# Patient Record
Sex: FEMALE | Employment: UNEMPLOYED | URBAN - METROPOLITAN AREA
[De-identification: names, ages, dates, MRNs, and addresses within clinical notes are randomized per-mention and may not be internally consistent; named-entity substitution may affect disease eponyms.]

---
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
## 2023-10-12 ENCOUNTER — HOSPITAL ENCOUNTER (EMERGENCY)
Age: 23
Discharge: HOME | End: 2023-10-12
Attending: EMERGENCY MEDICINE
Payer: MEDICAID

## 2023-10-12 ENCOUNTER — APPOINTMENT (OUTPATIENT)
Dept: GENERAL RADIOLOGY | Age: 23
End: 2023-10-12
Payer: MEDICAID

## 2023-10-12 VITALS
OXYGEN SATURATION: 94 % | DIASTOLIC BLOOD PRESSURE: 90 MMHG | TEMPERATURE: 99.3 F | HEART RATE: 89 BPM | WEIGHT: 201.72 LBS | RESPIRATION RATE: 18 BRPM | SYSTOLIC BLOOD PRESSURE: 134 MMHG

## 2023-10-12 DIAGNOSIS — O46.90 VAGINAL BLEEDING IN PREGNANCY: Primary | ICD-10-CM

## 2023-10-12 PROBLEM — O09.90 HRP (HIGH RISK PREGNANCY): Status: ACTIVE | Noted: 2023-10-12

## 2023-10-12 LAB
ANION GAP SERPL CALCULATED.3IONS-SCNC: 13 MMOL/L (ref 9–17)
BASOPHILS # BLD: 0.05 K/UL (ref 0–0.2)
BASOPHILS NFR BLD: 0 % (ref 0–2)
BILIRUB UR QL STRIP: NEGATIVE
BUN SERPL-MCNC: 4 MG/DL (ref 6–20)
CALCIUM SERPL-MCNC: 8.9 MG/DL (ref 8.6–10.4)
CANDIDA SPECIES: NEGATIVE
CASTS #/AREA URNS LPF: ABNORMAL /LPF (ref 0–8)
CHLORIDE SERPL-SCNC: 103 MMOL/L (ref 98–107)
CLARITY UR: CLEAR
CO2 SERPL-SCNC: 20 MMOL/L (ref 20–31)
COLOR UR: YELLOW
CREAT SERPL-MCNC: 0.5 MG/DL (ref 0.5–0.9)
D DIMER PPP FEU-MCNC: 0.36 UG/ML FEU (ref 0–0.57)
EOSINOPHIL # BLD: 0.35 K/UL (ref 0–0.44)
EOSINOPHILS RELATIVE PERCENT: 3 % (ref 1–4)
EPI CELLS #/AREA URNS HPF: ABNORMAL /HPF (ref 0–5)
ERYTHROCYTE [DISTWIDTH] IN BLOOD BY AUTOMATED COUNT: 13.2 % (ref 11.8–14.4)
GARDNERELLA VAGINALIS: NEGATIVE
GFR SERPL CREATININE-BSD FRML MDRD: >60 ML/MIN/1.73M2
GLUCOSE SERPL-MCNC: 93 MG/DL (ref 70–99)
GLUCOSE UR STRIP-MCNC: NEGATIVE MG/DL
HCT VFR BLD AUTO: 38.2 % (ref 36.3–47.1)
HGB BLD-MCNC: 13.3 G/DL (ref 11.9–15.1)
HGB UR QL STRIP.AUTO: NEGATIVE
IMM GRANULOCYTES # BLD AUTO: 0.04 K/UL (ref 0–0.3)
IMM GRANULOCYTES NFR BLD: 0 %
KETONES UR STRIP-MCNC: ABNORMAL MG/DL
LEUKOCYTE ESTERASE UR QL STRIP: NEGATIVE
LYMPHOCYTES NFR BLD: 2.24 K/UL (ref 1.2–5.2)
LYMPHOCYTES RELATIVE PERCENT: 18 % (ref 25–45)
MCH RBC QN AUTO: 30 PG (ref 25.2–33.5)
MCHC RBC AUTO-ENTMCNC: 34.8 G/DL (ref 28.4–34.8)
MCV RBC AUTO: 86 FL (ref 82.6–102.9)
MONOCYTES NFR BLD: 0.81 K/UL (ref 0.1–1.4)
MONOCYTES NFR BLD: 6 % (ref 2–8)
NEUTROPHILS NFR BLD: 73 % (ref 34–64)
NEUTS SEG NFR BLD: 9.28 K/UL (ref 1.8–8)
NITRITE UR QL STRIP: NEGATIVE
NRBC BLD-RTO: 0 PER 100 WBC
PH UR STRIP: 6 [PH] (ref 5–8)
PLATELET # BLD AUTO: 221 K/UL (ref 138–453)
PMV BLD AUTO: 10.1 FL (ref 8.1–13.5)
POTASSIUM SERPL-SCNC: 3.4 MMOL/L (ref 3.7–5.3)
PROT UR STRIP-MCNC: NEGATIVE MG/DL
RBC # BLD AUTO: 4.44 M/UL (ref 3.95–5.11)
RBC #/AREA URNS HPF: ABNORMAL /HPF (ref 0–4)
SODIUM SERPL-SCNC: 136 MMOL/L (ref 135–144)
SOURCE: NORMAL
SP GR UR STRIP: 1.01 (ref 1–1.03)
TRICHOMONAS: NEGATIVE
UROBILINOGEN UR STRIP-ACNC: NORMAL EU/DL (ref 0–1)
WBC #/AREA URNS HPF: ABNORMAL /HPF (ref 0–5)
WBC OTHER # BLD: 12.8 K/UL (ref 4.5–13.5)

## 2023-10-12 PROCEDURE — 87510 GARDNER VAG DNA DIR PROBE: CPT

## 2023-10-12 PROCEDURE — 87491 CHLMYD TRACH DNA AMP PROBE: CPT

## 2023-10-12 PROCEDURE — 87591 N.GONORRHOEAE DNA AMP PROB: CPT

## 2023-10-12 PROCEDURE — 87086 URINE CULTURE/COLONY COUNT: CPT

## 2023-10-12 PROCEDURE — 81001 URINALYSIS AUTO W/SCOPE: CPT

## 2023-10-12 PROCEDURE — 87660 TRICHOMONAS VAGIN DIR PROBE: CPT

## 2023-10-12 PROCEDURE — 80048 BASIC METABOLIC PNL TOTAL CA: CPT

## 2023-10-12 PROCEDURE — 99284 EMERGENCY DEPT VISIT MOD MDM: CPT

## 2023-10-12 PROCEDURE — 85025 COMPLETE CBC W/AUTO DIFF WBC: CPT

## 2023-10-12 PROCEDURE — 85379 FIBRIN DEGRADATION QUANT: CPT

## 2023-10-12 PROCEDURE — 87480 CANDIDA DNA DIR PROBE: CPT

## 2023-10-12 PROCEDURE — 71046 X-RAY EXAM CHEST 2 VIEWS: CPT

## 2023-10-12 RX ORDER — PNV NO.95/FERROUS FUM/FOLIC AC 28MG-0.8MG
1 TABLET ORAL
Qty: 90 TABLET | Refills: 11 | Status: SHIPPED | OUTPATIENT
Start: 2023-10-12

## 2023-10-13 ENCOUNTER — TELEPHONE (OUTPATIENT)
Dept: OBGYN | Age: 23
End: 2023-10-13

## 2023-10-13 PROBLEM — F31.9 BIPOLAR DISORDER (HCC): Status: ACTIVE | Noted: 2023-10-13

## 2023-10-13 PROBLEM — Z91.51 HISTORY OF SUICIDE ATTEMPT: Status: ACTIVE | Noted: 2023-10-13

## 2023-10-13 PROBLEM — Z62.810 HISTORY OF SEXUAL ABUSE IN CHILDHOOD: Status: ACTIVE | Noted: 2023-10-13

## 2023-10-13 PROBLEM — J45.909 MILD ASTHMA: Status: ACTIVE | Noted: 2023-10-13

## 2023-10-13 PROBLEM — F31.4 SEVERE DEPRESSED BIPOLAR I DISORDER WITHOUT PSYCHOTIC FEATURES (HCC): Status: ACTIVE | Noted: 2020-12-27

## 2023-10-13 PROBLEM — Z09 NEED FOR FOLLOW-UP CARE AFTER DISCHARGE: Status: ACTIVE | Noted: 2023-10-13

## 2023-10-13 PROBLEM — Z72.0 TOBACCO USE: Status: ACTIVE | Noted: 2023-10-13

## 2023-10-13 PROBLEM — F12.10 TETRAHYDROCANNABINOL (THC) USE DISORDER, MILD, ABUSE: Status: ACTIVE | Noted: 2023-10-13

## 2023-10-13 LAB
MICROORGANISM SPEC CULT: NO GROWTH
SPECIMEN DESCRIPTION: NORMAL

## 2023-10-13 RX ORDER — ALBUTEROL SULFATE 90 UG/1
2 AEROSOL, METERED RESPIRATORY (INHALATION) EVERY 6 HOURS PRN
COMMUNITY

## 2023-10-13 RX ORDER — LURASIDONE HYDROCHLORIDE 20 MG/1
TABLET, FILM COATED ORAL
COMMUNITY
Start: 2021-07-28

## 2023-10-13 NOTE — ED NOTES
Pt reports to the ED with complaints of vaginal bleeding x a few hours. Pt states she called squad at home just before coming in because she had a gush of fluid, pt denies contractions at this time. Pt states she is about 3 or 4 months pregnant but has not seen OB yet. Pt states this is her 3rd pregnancy and does have two living children, denies any complications during the pregnancy or birth. Pt states she has been having some bleeding as well, saturating about one pad per hour. Pt also denies any decreased fetal movement. Pt is A&O and speaking in complete sentences. Pt is resting in bed comfortably, NAD noted. Pt denies chest pain or SOB.  Care ongoing     Sae Reese RN  10/12/23 2027

## 2023-10-13 NOTE — ED NOTES
The following labs were labeled with appropriate pt sticker and tubed to lab:     [x] Blue     [x] Lavender   [] on ice  [x] Green/yellow  [] Green/black [] on ice  [] Marmark Lopez  [] on ice  [x] Yellow  [] Red  [] Pink  [] Type/ Screen  [] ABG  [] VBG    [] COVID-19 swab    [] Rapid  [] PCR  [] Flu swab  [] Peds Viral Panel     [] Urine Sample  [] Fecal Sample  [x] Pelvic Cultures  [] Blood Cultures  [] X 2  [] STREP Cultures       Anastasia Ly RN  10/12/23 3629

## 2023-10-13 NOTE — TELEPHONE ENCOUNTER
Writer called the number on file- patients mother answered the phone. Writer asked to speak with Chet and patients mother stated she was not there. Writer asked patients mother how patient was feeling and her mother responds with \"probably cold\". Patients mother states that patient is living on the streets because all of the shelters are full. Patient does not have a phone so writer scheduled appointment with patients mother who will bring her to appointment.

## 2023-10-13 NOTE — CONSULTS
[address]      Patient Name: Norberto Aguirre     Patient : 2000  Room/Bed:   Admission Date/Time: 10/12/2023  7:42 PM  Primary Care Physician: No primary care provider on file. Consulting Provider: Dr. Demian Johnson  Reason for Consult: vaginal bleeding in pregnancy     CC:   Chief Complaint   Patient presents with    Vaginal Bleeding     3 or 4 months pregnant                HPI: Norberto Aguirre is a 23 y.o. female  at unknown gestational age who presented to the ED endorsing leakage of fluids and vaginal bleeding with a brief episode of lower abdominal cramping earlier today which has since resolved. Patient is self-reporting pregnancy with unknown LMP. She estimates she is approximately 3-4 months gestation based on a home pregnancy test. No prior prenatal care. The patient reports she started having vaginal bleeding today that soaked through a pad. She then had a gush of clear fluid just prior to EMS arrival. She is unsure if she has continued leaking or bleeding. In the ED, patient's CBC, BMP, D-dimer, vaginitis, and UA were all wnl. Pelvic exam performed by ED resident showed no evidence of bleeding or pooling, closed cervical os. BSUS by ED resident showed active fetus with good FHR and subjectively adequate fluid. The patient reports she was previously a patient of the [facility] and then [facility] but was unhappy with her last delivery at [facility] which is why she has not yet sought care. She planned to find a new OB provider but was unsure where to go. On BSUS, GA measuring 14w4d by fetal biometry. Posterior placenta noted to be low-lying, with difficulty clearly seeing the placental edge in relation to the internal cervical os. Active fetus in cephalic presentation with HR 150s, and subjectively normal fluid.  Discussed repeat pelvic US with patient to obtain Amnisure and further assess amniotic membrane status, patient agreeable. REVIEW OF SYSTEMS:  Constitutional: negative fever, chills  HEENT: negative headaches, visual disturbances  Respiratory: negative dyspnea, cough, wheezing  Cardiovascular: negative chest pain, palpitations  Gastrointestinal: negative abdominal pain, RUQ pain, N/V, diarrhea, constipation  Genitourinary: negative dysuria, frequency, hesitancy, hematuria, changes in vaginal discharge  Dermatological: negative rash  Hematologic: negative bruising  Immunologic/Lymphatic: negative recent illness, recent sick contact, LE swelling   Musculoskeletal: negative back pain, myalgias, arthralgias  Neurological: negative dizziness, weakness, loss of sensation, tingling  Behavior/Psych: negative depression, anxiety  Obstetrics: positive fetal movement, positive LOF, positive vaginal bleeding, negative contractions, negative pelvic cramping    _______________________________________________________________________      OBSTETRIC HISTORY:   OB History    Para Term  AB Living   3 2 2 0 0 2   SAB IAB Ectopic Molar Multiple Live Births   0 0 0 0 0 2      # Outcome Date GA Lbr Kwame/2nd Weight Sex Delivery Anes PTL Lv   3 Current            2 Term 23 39w4d  8 lb 0.8 oz (3.65 kg) M Vag-Spont   LETHA      Apgar1: 8  Apgar5: 9   1 Term      Vag-Spont   LETHA       PAST MEDICAL HISTORY:   has a past medical history of ADHD (attention deficit hyperactivity disorder) and Depression. PAST SURGICAL HISTORY:  Denies surgical history     ALLERGIES:  Allergies as of 10/12/2023    (No Known Allergies)       MEDICATIONS:  No current facility-administered medications for this encounter.      Current Outpatient Medications   Medication Sig Dispense Refill    lurasidone (LATUDA) 20 MG TABS tablet       Prenatal Vit-Fe Fumarate-FA (PRENATAL VITAMINS) 28-0.8 MG TABS Take 1 tablet by mouth daily (with breakfast) 90 tablet 11    albuterol sulfate HFA (PROVENTIL;VENTOLIN;PROAIR) 108 (90 Base) MCG/ACT inhaler Inhale 2 puffs

## 2023-10-13 NOTE — TELEPHONE ENCOUNTER
----- Message from Ambika Miner DO sent at 10/13/2023  3:47 AM EDT -----  Regarding: Needs ED follow up  Hi there,     This patient was seen in the ED for leakage of fluid at 14w4d by BSUS. It is undetermined at this time but we will need to follow up with her next week to assess symptoms and see how she is doing. Can we schedule her for an ED follow up later next week?      Thanks,  Topher Joseph

## 2023-10-14 LAB
C TRACH DNA SPEC QL PROBE+SIG AMP: NEGATIVE
N GONORRHOEA DNA SPEC QL PROBE+SIG AMP: NEGATIVE
SPECIMEN DESCRIPTION: NORMAL

## 2024-02-11 ENCOUNTER — HOSPITAL ENCOUNTER (OUTPATIENT)
Age: 24
Discharge: HOME | End: 2024-02-12
Attending: OBSTETRICS & GYNECOLOGY | Admitting: OBSTETRICS & GYNECOLOGY
Payer: MEDICAID

## 2024-02-11 PROBLEM — O99.330 TOBACCO USE DISORDER COMPLICATING PREGNANCY, CHILDBIRTH, OR PUERPERIUM, ANTEPARTUM: Status: ACTIVE | Noted: 2024-02-11

## 2024-02-11 PROBLEM — Z3A.32 32 WEEKS GESTATION OF PREGNANCY: Status: ACTIVE | Noted: 2024-02-11

## 2024-02-11 PROBLEM — O35.2XX0 HEREDITARY DISEASE IN FAMILY POSSIBLY AFFECTING FETUS, AFFECTING MANAGEMENT OF MOTHER, ANTEPARTUM CONDITION OR COMPLICATION: Status: ACTIVE | Noted: 2024-02-11

## 2024-02-11 PROBLEM — O46.93 VAGINAL BLEEDING IN PREGNANCY, THIRD TRIMESTER: Status: ACTIVE | Noted: 2024-02-11

## 2024-02-11 PROBLEM — O09.33 INSUFFICIENT PRENATAL CARE IN THIRD TRIMESTER: Status: ACTIVE | Noted: 2024-02-11

## 2024-02-11 LAB
ABO + RH BLD: NORMAL
ARM BAND NUMBER: NORMAL
BACTERIA URNS QL MICRO: NORMAL
BASOPHILS # BLD: 0.06 K/UL (ref 0–0.2)
BASOPHILS NFR BLD: 1 % (ref 0–2)
BILIRUB UR QL STRIP: NEGATIVE
BLOOD BANK SAMPLE EXPIRATION: NORMAL
BLOOD GROUP ANTIBODIES SERPL: NEGATIVE
CANDIDA SPECIES: NEGATIVE
CASTS #/AREA URNS LPF: NORMAL /LPF (ref 0–8)
CLARITY UR: CLEAR
COLOR UR: YELLOW
EOSINOPHIL # BLD: 0.29 K/UL (ref 0–0.44)
EOSINOPHILS RELATIVE PERCENT: 3 % (ref 1–4)
EPI CELLS #/AREA URNS HPF: NORMAL /HPF (ref 0–5)
ERYTHROCYTE [DISTWIDTH] IN BLOOD BY AUTOMATED COUNT: 12.8 % (ref 11.8–14.4)
GARDNERELLA VAGINALIS: NEGATIVE
GLUCOSE UR STRIP-MCNC: NEGATIVE MG/DL
HBV SURFACE AG SERPL QL IA: NONREACTIVE
HCT VFR BLD AUTO: 38.3 % (ref 36.3–47.1)
HCV AB SERPL QL IA: NONREACTIVE
HGB BLD-MCNC: 12.6 G/DL (ref 11.9–15.1)
HGB UR QL STRIP.AUTO: ABNORMAL
HIV 1+2 AB+HIV1 P24 AG SERPL QL IA: NONREACTIVE
IMM GRANULOCYTES # BLD AUTO: 0.09 K/UL (ref 0–0.3)
IMM GRANULOCYTES NFR BLD: 1 %
KETONES UR STRIP-MCNC: NEGATIVE MG/DL
LEUKOCYTE ESTERASE UR QL STRIP: NEGATIVE
LYMPHOCYTES NFR BLD: 2.53 K/UL (ref 1.1–3.7)
LYMPHOCYTES RELATIVE PERCENT: 30 % (ref 25–45)
MCH RBC QN AUTO: 30.4 PG (ref 25.2–33.5)
MCHC RBC AUTO-ENTMCNC: 32.9 G/DL (ref 28.4–34.8)
MCV RBC AUTO: 92.5 FL (ref 82.6–102.9)
MONOCYTES NFR BLD: 0.66 K/UL (ref 0.1–1.4)
MONOCYTES NFR BLD: 8 % (ref 2–8)
NEUTROPHILS NFR BLD: 57 % (ref 34–64)
NEUTS SEG NFR BLD: 4.9 K/UL (ref 1.5–8.1)
NITRITE UR QL STRIP: NEGATIVE
NRBC BLD-RTO: 0 PER 100 WBC
PH UR STRIP: 7.5 [PH] (ref 5–8)
PLATELET # BLD AUTO: 177 K/UL (ref 138–453)
PMV BLD AUTO: 10.4 FL (ref 8.1–13.5)
PROT UR STRIP-MCNC: ABNORMAL MG/DL
RBC # BLD AUTO: 4.14 M/UL (ref 3.95–5.11)
RBC #/AREA URNS HPF: NORMAL /HPF (ref 0–4)
RUBV IGG SERPL QL IA: 35.2 IU/ML
SOURCE: NORMAL
SP GR UR STRIP: 1.01 (ref 1–1.03)
T PALLIDUM AB SER QL IA: NONREACTIVE
TRICHOMONAS: NEGATIVE
UROBILINOGEN UR STRIP-ACNC: NORMAL EU/DL (ref 0–1)
WBC #/AREA URNS HPF: NORMAL /HPF (ref 0–5)
WBC OTHER # BLD: 8.5 K/UL (ref 4.5–13.5)

## 2024-02-11 PROCEDURE — 86900 BLOOD TYPING SEROLOGIC ABO: CPT

## 2024-02-11 PROCEDURE — 86901 BLOOD TYPING SEROLOGIC RH(D): CPT

## 2024-02-11 PROCEDURE — 86803 HEPATITIS C AB TEST: CPT

## 2024-02-11 PROCEDURE — 76811 OB US DETAILED SNGL FETUS: CPT | Performed by: OBSTETRICS & GYNECOLOGY

## 2024-02-11 PROCEDURE — 86850 RBC ANTIBODY SCREEN: CPT

## 2024-02-11 PROCEDURE — 6360000002 HC RX W HCPCS

## 2024-02-11 PROCEDURE — 99215 OFFICE O/P EST HI 40 MIN: CPT

## 2024-02-11 PROCEDURE — 36415 COLL VENOUS BLD VENIPUNCTURE: CPT

## 2024-02-11 PROCEDURE — 87389 HIV-1 AG W/HIV-1&-2 AB AG IA: CPT

## 2024-02-11 PROCEDURE — 85025 COMPLETE CBC W/AUTO DIFF WBC: CPT

## 2024-02-11 PROCEDURE — 76819 FETAL BIOPHYS PROFIL W/O NST: CPT | Performed by: OBSTETRICS & GYNECOLOGY

## 2024-02-11 PROCEDURE — 6370000000 HC RX 637 (ALT 250 FOR IP): Performed by: STUDENT IN AN ORGANIZED HEALTH CARE EDUCATION/TRAINING PROGRAM

## 2024-02-11 PROCEDURE — 87086 URINE CULTURE/COLONY COUNT: CPT

## 2024-02-11 PROCEDURE — 87510 GARDNER VAG DNA DIR PROBE: CPT

## 2024-02-11 PROCEDURE — 76817 TRANSVAGINAL US OBSTETRIC: CPT | Performed by: OBSTETRICS & GYNECOLOGY

## 2024-02-11 PROCEDURE — 96372 THER/PROPH/DIAG INJ SC/IM: CPT

## 2024-02-11 PROCEDURE — 86780 TREPONEMA PALLIDUM: CPT

## 2024-02-11 PROCEDURE — 81001 URINALYSIS AUTO W/SCOPE: CPT

## 2024-02-11 PROCEDURE — 87491 CHLMYD TRACH DNA AMP PROBE: CPT

## 2024-02-11 PROCEDURE — 87340 HEPATITIS B SURFACE AG IA: CPT

## 2024-02-11 PROCEDURE — 86762 RUBELLA ANTIBODY: CPT

## 2024-02-11 PROCEDURE — 87480 CANDIDA DNA DIR PROBE: CPT

## 2024-02-11 PROCEDURE — 87660 TRICHOMONAS VAGIN DIR PROBE: CPT

## 2024-02-11 PROCEDURE — 87591 N.GONORRHOEAE DNA AMP PROB: CPT

## 2024-02-11 RX ORDER — VITAMIN A, ASCORBIC ACID, CHOLECALCIFEROL, .ALPHA.-TOCOPHEROL ACETATE, DL-, THIAMINE MONONITRATE, RIBOFLAVIN, NIACINAMIDE, PYRIDOXINE HYDROCHLORIDE, FOLIC ACID, CYANOCOBALAMIN, CALCIUM CARBONATE, IRON, ZINC OXIDE, AND CUPRIC OXIDE 4000; 120; 400; 22; 1.84; 3; 20; 10; 1; 12; 200; 29; 25; 2 [IU]/1; MG/1; [IU]/1; [IU]/1; MG/1; MG/1; MG/1; MG/1; MG/1; UG/1; MG/1; MG/1; MG/1; MG/1
1 TABLET ORAL DAILY
Status: DISCONTINUED | OUTPATIENT
Start: 2024-02-11 | End: 2024-02-12 | Stop reason: HOSPADM

## 2024-02-11 RX ORDER — ONDANSETRON 2 MG/ML
4 INJECTION INTRAMUSCULAR; INTRAVENOUS EVERY 6 HOURS PRN
Status: DISCONTINUED | OUTPATIENT
Start: 2024-02-11 | End: 2024-02-12 | Stop reason: HOSPADM

## 2024-02-11 RX ORDER — ACETAMINOPHEN 500 MG
1000 TABLET ORAL EVERY 6 HOURS PRN
Status: DISCONTINUED | OUTPATIENT
Start: 2024-02-11 | End: 2024-02-12 | Stop reason: HOSPADM

## 2024-02-11 RX ORDER — ONDANSETRON 4 MG/1
4 TABLET, ORALLY DISINTEGRATING ORAL EVERY 8 HOURS PRN
Status: DISCONTINUED | OUTPATIENT
Start: 2024-02-11 | End: 2024-02-12 | Stop reason: HOSPADM

## 2024-02-11 RX ORDER — BETAMETHASONE SODIUM PHOSPHATE AND BETAMETHASONE ACETATE 3; 3 MG/ML; MG/ML
12 INJECTION, SUSPENSION INTRA-ARTICULAR; INTRALESIONAL; INTRAMUSCULAR; SOFT TISSUE EVERY 24 HOURS
Status: COMPLETED | OUTPATIENT
Start: 2024-02-11 | End: 2024-02-12

## 2024-02-11 RX ADMIN — BETAMETHASONE SODIUM PHOSPHATE AND BETAMETHASONE ACETATE 12 MG: 3; 3 INJECTION, SUSPENSION INTRA-ARTICULAR; INTRALESIONAL; INTRAMUSCULAR at 02:46

## 2024-02-11 RX ADMIN — Medication 1 TABLET: at 10:23
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
## 2024-02-11 NOTE — FLOWSHEET NOTE
RN at bedside. Pt states bleeding seems \"less and less, everytime I go to the bathroom.\" Denies pain.

## 2024-02-11 NOTE — PROGRESS NOTES
Maternal Fetal Medicine   Ultrasound Interval Note    Romina Amezcua is a 23 y.o. female  at 32w0d     Case discussed with Dr. Moore.     MFM Sono Report (Verbal): BPP . EFW 3#13 consistent with dating 32w0d. Normal anatomy. No signs of placental abruption or placental pathology.     Plan: Patient to remain on the monitor for in patient evaluation with second steroid dose on 24 at 0246.  Please refer to report for further details.    Discussed with patient ultrasound results and above noted plan. Also discussed NIPT and genetic carrier screening. Patient is declined to both NIPT and genetic carrier screening.     Dr. Moore updated, RN updated.     Amberly Nevarez DO  Ob/Gyn Resident  2024, 10:06 AM

## 2024-02-12 VITALS
SYSTOLIC BLOOD PRESSURE: 132 MMHG | TEMPERATURE: 98.1 F | HEART RATE: 79 BPM | DIASTOLIC BLOOD PRESSURE: 79 MMHG | OXYGEN SATURATION: 98 % | RESPIRATION RATE: 16 BRPM

## 2024-02-12 LAB
C TRACH DNA SPEC QL PROBE+SIG AMP: NEGATIVE
MICROORGANISM SPEC CULT: NORMAL
N GONORRHOEA DNA SPEC QL PROBE+SIG AMP: NEGATIVE
SPECIMEN DESCRIPTION: NORMAL
SPECIMEN DESCRIPTION: NORMAL

## 2024-02-12 PROCEDURE — 6360000002 HC RX W HCPCS

## 2024-02-12 PROCEDURE — 96372 THER/PROPH/DIAG INJ SC/IM: CPT

## 2024-02-12 RX ADMIN — BETAMETHASONE SODIUM PHOSPHATE AND BETAMETHASONE ACETATE 12 MG: 3; 3 INJECTION, SUSPENSION INTRA-ARTICULAR; INTRALESIONAL; INTRAMUSCULAR at 02:46
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
## 2024-04-03 ENCOUNTER — ANESTHESIA EVENT (OUTPATIENT)
Dept: LABOR AND DELIVERY | Age: 24
DRG: 560 | End: 2024-04-03
Payer: MEDICAID

## 2024-04-03 ENCOUNTER — ANESTHESIA (OUTPATIENT)
Dept: LABOR AND DELIVERY | Age: 24
DRG: 560 | End: 2024-04-03
Payer: MEDICAID

## 2024-04-03 ENCOUNTER — HOSPITAL ENCOUNTER (INPATIENT)
Age: 24
LOS: 2 days | Discharge: HOME | DRG: 560 | End: 2024-04-05
Attending: STUDENT IN AN ORGANIZED HEALTH CARE EDUCATION/TRAINING PROGRAM | Admitting: STUDENT IN AN ORGANIZED HEALTH CARE EDUCATION/TRAINING PROGRAM
Payer: MEDICAID

## 2024-04-03 PROBLEM — Z3A.38 38 WEEKS GESTATION OF PREGNANCY: Status: ACTIVE | Noted: 2024-04-03

## 2024-04-03 PROBLEM — Z09 NEED FOR FOLLOW-UP CARE AFTER DISCHARGE: Status: RESOLVED | Noted: 2023-10-13 | Resolved: 2024-04-03

## 2024-04-03 PROBLEM — O35.2XX0 HEREDITARY DISEASE IN FAMILY POSSIBLY AFFECTING FETUS, AFFECTING MANAGEMENT OF MOTHER, ANTEPARTUM CONDITION OR COMPLICATION: Status: RESOLVED | Noted: 2024-02-11 | Resolved: 2024-04-03

## 2024-04-03 PROBLEM — O99.330 TOBACCO USE DISORDER COMPLICATING PREGNANCY, CHILDBIRTH, OR PUERPERIUM, ANTEPARTUM: Status: RESOLVED | Noted: 2024-02-11 | Resolved: 2024-04-03

## 2024-04-03 PROBLEM — O46.93 VAGINAL BLEEDING IN PREGNANCY, THIRD TRIMESTER: Status: RESOLVED | Noted: 2024-02-11 | Resolved: 2024-04-03

## 2024-04-03 PROBLEM — Z3A.32 32 WEEKS GESTATION OF PREGNANCY: Status: RESOLVED | Noted: 2024-02-11 | Resolved: 2024-04-03

## 2024-04-03 LAB
ABO + RH BLD: NORMAL
ALBUMIN SERPL-MCNC: 3.6 G/DL (ref 3.5–5.2)
ALBUMIN/GLOB SERPL: 1 {RATIO} (ref 1–2.5)
ALP SERPL-CCNC: 166 U/L (ref 35–104)
ALT SERPL-CCNC: 10 U/L (ref 10–35)
AMPHET UR QL SCN: NEGATIVE
ANION GAP SERPL CALCULATED.3IONS-SCNC: 14 MMOL/L (ref 9–16)
ARM BAND NUMBER: NORMAL
AST SERPL-CCNC: 20 U/L (ref 10–35)
BARBITURATES UR QL SCN: NEGATIVE
BASOPHILS # BLD: 0.06 K/UL (ref 0–0.2)
BASOPHILS NFR BLD: 0 % (ref 0–2)
BENZODIAZ UR QL: NEGATIVE
BILIRUB SERPL-MCNC: 0.4 MG/DL (ref 0–1.2)
BLOOD BANK SAMPLE EXPIRATION: NORMAL
BLOOD GROUP ANTIBODIES SERPL: NEGATIVE
BUN SERPL-MCNC: 6 MG/DL (ref 6–20)
CALCIUM SERPL-MCNC: 9.8 MG/DL (ref 8.6–10.4)
CANNABINOIDS UR QL SCN: POSITIVE
CHLORIDE SERPL-SCNC: 106 MMOL/L (ref 98–107)
CO2 SERPL-SCNC: 18 MMOL/L (ref 20–31)
COCAINE UR QL SCN: NEGATIVE
CREAT SERPL-MCNC: 0.6 MG/DL (ref 0.5–0.9)
CREAT UR-MCNC: 100 MG/DL (ref 28–217)
EOSINOPHIL # BLD: 0.06 K/UL (ref 0–0.44)
EOSINOPHILS RELATIVE PERCENT: 0 % (ref 1–4)
ERYTHROCYTE [DISTWIDTH] IN BLOOD BY AUTOMATED COUNT: 12.9 % (ref 11.8–14.4)
FENTANYL UR QL: NEGATIVE
GFR SERPL CREATININE-BSD FRML MDRD: >90 ML/MIN/1.73M2
GLUCOSE SERPL-MCNC: 88 MG/DL (ref 74–99)
HCT VFR BLD AUTO: 38.8 % (ref 36.3–47.1)
HGB BLD-MCNC: 13.4 G/DL (ref 11.9–15.1)
IMM GRANULOCYTES # BLD AUTO: 0.19 K/UL (ref 0–0.3)
IMM GRANULOCYTES NFR BLD: 1 %
LYMPHOCYTES NFR BLD: 2.92 K/UL (ref 1.1–3.7)
LYMPHOCYTES RELATIVE PERCENT: 14 % (ref 25–45)
MCH RBC QN AUTO: 30.2 PG (ref 25.2–33.5)
MCHC RBC AUTO-ENTMCNC: 34.5 G/DL (ref 28.4–34.8)
MCV RBC AUTO: 87.6 FL (ref 82.6–102.9)
METHADONE UR QL: NEGATIVE
MONOCYTES NFR BLD: 1.09 K/UL (ref 0.1–1.4)
MONOCYTES NFR BLD: 5 % (ref 2–8)
NEUTROPHILS NFR BLD: 80 % (ref 34–64)
NEUTS SEG NFR BLD: 16.26 K/UL (ref 1.5–8.1)
NRBC BLD-RTO: 0 PER 100 WBC
OPIATES UR QL SCN: NEGATIVE
OXYCODONE UR QL SCN: NEGATIVE
PCP UR QL SCN: NEGATIVE
PLATELET # BLD AUTO: 231 K/UL (ref 138–453)
PMV BLD AUTO: 10.5 FL (ref 8.1–13.5)
POTASSIUM SERPL-SCNC: 3.9 MMOL/L (ref 3.7–5.3)
PROT SERPL-MCNC: 6.7 G/DL (ref 6.6–8.7)
RBC # BLD AUTO: 4.43 M/UL (ref 3.95–5.11)
SODIUM SERPL-SCNC: 138 MMOL/L (ref 136–145)
T PALLIDUM AB SER QL IA: NONREACTIVE
TEST INFORMATION: ABNORMAL
TOTAL PROTEIN, URINE: 17 MG/DL
URINE TOTAL PROTEIN CREATININE RATIO: 0.17
WBC OTHER # BLD: 20.6 K/UL (ref 4.5–13.5)

## 2024-04-03 PROCEDURE — 82570 ASSAY OF URINE CREATININE: CPT

## 2024-04-03 PROCEDURE — 6360000002 HC RX W HCPCS

## 2024-04-03 PROCEDURE — 86901 BLOOD TYPING SEROLOGIC RH(D): CPT

## 2024-04-03 PROCEDURE — 84156 ASSAY OF PROTEIN URINE: CPT

## 2024-04-03 PROCEDURE — 1220000000 HC SEMI PRIVATE OB R&B

## 2024-04-03 PROCEDURE — 6360000002 HC RX W HCPCS: Performed by: ANESTHESIOLOGY

## 2024-04-03 PROCEDURE — 86900 BLOOD TYPING SEROLOGIC ABO: CPT

## 2024-04-03 PROCEDURE — 0UQMXZZ REPAIR VULVA, EXTERNAL APPROACH: ICD-10-PCS | Performed by: STUDENT IN AN ORGANIZED HEALTH CARE EDUCATION/TRAINING PROGRAM

## 2024-04-03 PROCEDURE — 51701 INSERT BLADDER CATHETER: CPT

## 2024-04-03 PROCEDURE — 85025 COMPLETE CBC W/AUTO DIFF WBC: CPT

## 2024-04-03 PROCEDURE — 7200000001 HC VAGINAL DELIVERY

## 2024-04-03 PROCEDURE — 2500000003 HC RX 250 WO HCPCS: Performed by: NURSE ANESTHETIST, CERTIFIED REGISTERED

## 2024-04-03 PROCEDURE — 80307 DRUG TEST PRSMV CHEM ANLYZR: CPT

## 2024-04-03 PROCEDURE — 2580000003 HC RX 258

## 2024-04-03 PROCEDURE — 59409 OBSTETRICAL CARE: CPT | Performed by: STUDENT IN AN ORGANIZED HEALTH CARE EDUCATION/TRAINING PROGRAM

## 2024-04-03 PROCEDURE — 86850 RBC ANTIBODY SCREEN: CPT

## 2024-04-03 PROCEDURE — 3700000025 EPIDURAL BLOCK: Performed by: ANESTHESIOLOGY

## 2024-04-03 PROCEDURE — 80053 COMPREHEN METABOLIC PANEL: CPT

## 2024-04-03 PROCEDURE — 86780 TREPONEMA PALLIDUM: CPT

## 2024-04-03 RX ORDER — ONDANSETRON 4 MG/1
4 TABLET, ORALLY DISINTEGRATING ORAL EVERY 6 HOURS PRN
Status: DISCONTINUED | OUTPATIENT
Start: 2024-04-03 | End: 2024-04-05 | Stop reason: HOSPADM

## 2024-04-03 RX ORDER — SODIUM CHLORIDE 9 MG/ML
25 INJECTION, SOLUTION INTRAVENOUS PRN
Status: DISCONTINUED | OUTPATIENT
Start: 2024-04-03 | End: 2024-04-04

## 2024-04-03 RX ORDER — SODIUM CHLORIDE, SODIUM LACTATE, POTASSIUM CHLORIDE, AND CALCIUM CHLORIDE .6; .31; .03; .02 G/100ML; G/100ML; G/100ML; G/100ML
1000 INJECTION, SOLUTION INTRAVENOUS PRN
Status: DISCONTINUED | OUTPATIENT
Start: 2024-04-03 | End: 2024-04-04

## 2024-04-03 RX ORDER — ONDANSETRON 2 MG/ML
4 INJECTION INTRAMUSCULAR; INTRAVENOUS EVERY 6 HOURS PRN
Status: DISCONTINUED | OUTPATIENT
Start: 2024-04-03 | End: 2024-04-04

## 2024-04-03 RX ORDER — SENNA AND DOCUSATE SODIUM 50; 8.6 MG/1; MG/1
1 TABLET, FILM COATED ORAL DAILY
Qty: 30 TABLET | Refills: 1 | Status: SHIPPED | OUTPATIENT
Start: 2024-04-03

## 2024-04-03 RX ORDER — ONDANSETRON 2 MG/ML
4 INJECTION INTRAMUSCULAR; INTRAVENOUS EVERY 6 HOURS PRN
Status: DISCONTINUED | OUTPATIENT
Start: 2024-04-03 | End: 2024-04-05 | Stop reason: HOSPADM

## 2024-04-03 RX ORDER — DIPHENHYDRAMINE HCL 25 MG
25 TABLET ORAL EVERY 4 HOURS PRN
Status: DISCONTINUED | OUTPATIENT
Start: 2024-04-03 | End: 2024-04-04

## 2024-04-03 RX ORDER — SODIUM CHLORIDE 0.9 % (FLUSH) 0.9 %
5-40 SYRINGE (ML) INJECTION PRN
Status: DISCONTINUED | OUTPATIENT
Start: 2024-04-03 | End: 2024-04-05 | Stop reason: HOSPADM

## 2024-04-03 RX ORDER — ACETAMINOPHEN 500 MG
1000 TABLET ORAL EVERY 6 HOURS PRN
Qty: 60 TABLET | Refills: 1 | Status: SHIPPED | OUTPATIENT
Start: 2024-04-03

## 2024-04-03 RX ORDER — ACETAMINOPHEN 500 MG
1000 TABLET ORAL EVERY 6 HOURS PRN
Status: DISCONTINUED | OUTPATIENT
Start: 2024-04-03 | End: 2024-04-05 | Stop reason: HOSPADM

## 2024-04-03 RX ORDER — SODIUM CHLORIDE, SODIUM LACTATE, POTASSIUM CHLORIDE, AND CALCIUM CHLORIDE .6; .31; .03; .02 G/100ML; G/100ML; G/100ML; G/100ML
500 INJECTION, SOLUTION INTRAVENOUS PRN
Status: DISCONTINUED | OUTPATIENT
Start: 2024-04-03 | End: 2024-04-04

## 2024-04-03 RX ORDER — IBUPROFEN 600 MG/1
600 TABLET ORAL EVERY 6 HOURS PRN
Qty: 30 TABLET | Refills: 1 | Status: SHIPPED | OUTPATIENT
Start: 2024-04-03

## 2024-04-03 RX ORDER — LIDOCAINE HYDROCHLORIDE AND EPINEPHRINE 15; 5 MG/ML; UG/ML
INJECTION, SOLUTION EPIDURAL PRN
Status: DISCONTINUED | OUTPATIENT
Start: 2024-04-03 | End: 2024-04-03 | Stop reason: SDUPTHER

## 2024-04-03 RX ORDER — DIPHENHYDRAMINE HYDROCHLORIDE 50 MG/ML
25 INJECTION INTRAMUSCULAR; INTRAVENOUS EVERY 4 HOURS PRN
Status: DISCONTINUED | OUTPATIENT
Start: 2024-04-03 | End: 2024-04-04

## 2024-04-03 RX ORDER — SODIUM CHLORIDE 0.9 % (FLUSH) 0.9 %
5-40 SYRINGE (ML) INJECTION EVERY 12 HOURS SCHEDULED
Status: DISCONTINUED | OUTPATIENT
Start: 2024-04-03 | End: 2024-04-04

## 2024-04-03 RX ORDER — NALOXONE HYDROCHLORIDE 0.4 MG/ML
INJECTION, SOLUTION INTRAMUSCULAR; INTRAVENOUS; SUBCUTANEOUS PRN
Status: DISCONTINUED | OUTPATIENT
Start: 2024-04-03 | End: 2024-04-04

## 2024-04-03 RX ORDER — SODIUM CHLORIDE, SODIUM LACTATE, POTASSIUM CHLORIDE, CALCIUM CHLORIDE 600; 310; 30; 20 MG/100ML; MG/100ML; MG/100ML; MG/100ML
INJECTION, SOLUTION INTRAVENOUS CONTINUOUS
Status: DISCONTINUED | OUTPATIENT
Start: 2024-04-03 | End: 2024-04-04

## 2024-04-03 RX ORDER — SEVOFLURANE 250 ML/250ML
1 LIQUID RESPIRATORY (INHALATION) CONTINUOUS PRN
Status: DISCONTINUED | OUTPATIENT
Start: 2024-04-03 | End: 2024-04-04

## 2024-04-03 RX ADMIN — Medication 87.3 MILLI-UNITS/MIN: at 22:40

## 2024-04-03 RX ADMIN — SODIUM CHLORIDE, POTASSIUM CHLORIDE, SODIUM LACTATE AND CALCIUM CHLORIDE: 600; 310; 30; 20 INJECTION, SOLUTION INTRAVENOUS at 14:15

## 2024-04-03 RX ADMIN — Medication 1 MILLI-UNITS/MIN: at 20:34

## 2024-04-03 RX ADMIN — SODIUM CHLORIDE, POTASSIUM CHLORIDE, SODIUM LACTATE AND CALCIUM CHLORIDE: 600; 310; 30; 20 INJECTION, SOLUTION INTRAVENOUS at 18:16

## 2024-04-03 RX ADMIN — LIDOCAINE HYDROCHLORIDE,EPINEPHRINE BITARTRATE 2 ML: 15; .005 INJECTION, SOLUTION EPIDURAL; INFILTRATION; INTRACAUDAL; PERINEURAL at 15:24

## 2024-04-03 RX ADMIN — LIDOCAINE HYDROCHLORIDE,EPINEPHRINE BITARTRATE 3 ML: 15; .005 INJECTION, SOLUTION EPIDURAL; INFILTRATION; INTRACAUDAL; PERINEURAL at 15:21

## 2024-04-03 RX ADMIN — ROPIVACAINE HYDROCHLORIDE 7 ML: 2 INJECTION EPIDURAL; INFILTRATION; PERINEURAL at 15:28

## 2024-04-03 RX ADMIN — Medication 166.7 ML: at 22:28

## 2024-04-03 RX ADMIN — ROPIVACAINE HYDROCHLORIDE 11 ML/HR: 2 INJECTION EPIDURAL; INFILTRATION; PERINEURAL at 15:29

## 2024-04-03 ASSESSMENT — PAIN DESCRIPTION - ORIENTATION: ORIENTATION: LOWER

## 2024-04-03 ASSESSMENT — PAIN DESCRIPTION - DESCRIPTORS
DESCRIPTORS: CRAMPING
DESCRIPTORS: CRAMPING

## 2024-04-03 ASSESSMENT — PAIN SCALES - GENERAL
PAINLEVEL_OUTOF10: 8
PAINLEVEL_OUTOF10: 0
PAINLEVEL_OUTOF10: 2

## 2024-04-03 ASSESSMENT — PAIN DESCRIPTION - LOCATION
LOCATION: ABDOMEN
LOCATION: ABDOMEN

## 2024-04-03 NOTE — FLOWSHEET NOTE
Nicho noted at 1844 with positioning on back for straight cath.  Patient had been contacting irregularly q 2-6.  Patient feeling slightly more pressure with positioning of left side with peanut ball.  Dr. Mercado in department and updated.
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
## 2024-04-03 NOTE — FLOWSHEET NOTE
Patient arrived on L&D unit per EMS squad.  Patient states she started brett around 0500 AM.   \"Water broke\" at 1300 and patient called squad.    Dr. Martinez at bedside.  Cephalic presentation confirmed.   SVE 5cm.  Nitrazine positive.

## 2024-04-03 NOTE — PROGRESS NOTES
Obstetric/Gynecology Resident Interval Note    Notified by RN that patient feeling more pressure. Writer to patient bedside. SVE 6-7/90/0 with forebag present. Patient using Nitrous oxide. Epidural is ordered.   Continue to monitor closely.      Dr. Power updated.    Alfreda Gordon DO  OB/GYN Resident, PGY3  [place], [place]  4/3/2024, 6:29 PM

## 2024-04-03 NOTE — ANESTHESIA PRE PROCEDURE
Department of Anesthesiology  Preprocedure Note       Name:  Romina Amezcua   Age:  23 y.o.  :  2000                                          MRN:  [id number]         Date:  4/3/2024      Surgeon: * No surgeons listed *    Procedure: * No procedures listed *    Medications prior to admission:   Prior to Admission medications    Medication Sig Start Date End Date Taking? Authorizing Provider   albuterol sulfate HFA (PROVENTIL;VENTOLIN;PROAIR) 108 (90 Base) MCG/ACT inhaler Inhale 2 puffs into the lungs every 6 hours as needed    Provider, MD Wolfgang   lurasidone (LATUDA) 20 MG TABS tablet  21   Provider, MD Wolfgang   Prenatal Vit-Fe Fumarate-FA (PRENATAL VITAMINS) 28-0.8 MG TABS Take 1 tablet by mouth daily (with breakfast) 10/12/23   Holli Guallpa DO       Current medications:    Current Facility-Administered Medications   Medication Dose Route Frequency Provider Last Rate Last Admin    lactated ringers IV soln infusion   IntraVENous Continuous Gilma Martinez DO        lactated ringers bolus 500 mL  500 mL IntraVENous PRN Gilma Martinez, DO        Or    lactated ringers bolus 1,000 mL  1,000 mL IntraVENous PRN Gilma Martinez, DO        sodium chloride flush 0.9 % injection 5-40 mL  5-40 mL IntraVENous 2 times per day Gilma Martinez, DO        sodium chloride flush 0.9 % injection 5-40 mL  5-40 mL IntraVENous PRN Gilma Martinez, DO        0.9 % sodium chloride infusion  25 mL IntraVENous PRN Gilma Martinez, DO        diphenhydrAMINE (BENADRYL) tablet 25 mg  25 mg Oral Q4H PRN Gilma Martinez, DO        Or    diphenhydrAMINE (BENADRYL) injection 25 mg  25 mg IntraVENous Q4H PRN Gilma Martinez, DO        acetaminophen (TYLENOL) tablet 1,000 mg  1,000 mg Oral Q6H PRN Gilma Martinez, DO        benzocaine-menthol (DERMOPLAST) 20-0.5 % spray   Topical PRN Gilma Martinez, DO        oxytocin (PITOCIN) 30 units in 500 mL infusion Override Pull           87.6 04/03/2024 02:23 PM    RDW 12.9 04/03/2024 02:23 PM     04/03/2024 02:23 PM       CMP:   Lab Results   Component Value Date/Time     10/12/2023 09:01 PM    K 3.4 10/12/2023 09:01 PM     10/12/2023 09:01 PM    CO2 20 10/12/2023 09:01 PM    BUN 4 10/12/2023 09:01 PM    CREATININE 0.5 10/12/2023 09:01 PM    GFRAA NOT REPORTED 02/21/2017 12:04 AM    AGRATIO 1.4 02/21/2017 12:04 AM    LABGLOM >60 10/12/2023 09:01 PM    GLUCOSE 93 10/12/2023 09:01 PM    PROT 7.1 02/21/2017 12:04 AM    CALCIUM 8.9 10/12/2023 09:01 PM    BILITOT 0.55 02/21/2017 12:04 AM    ALKPHOS 96 02/21/2017 12:04 AM    AST 12 02/21/2017 12:04 AM    ALT 11 02/21/2017 12:04 AM       POC Tests: No results for input(s): \"POCGLU\", \"POCNA\", \"POCK\", \"POCCL\", \"POCBUN\", \"POCHEMO\", \"POCHCT\" in the last 72 hours.    Coags: No results found for: \"PROTIME\", \"INR\", \"APTT\"    HCG (If Applicable): No results found for: \"PREGTESTUR\", \"PREGSERUM\", \"HCG\", \"HCGQUANT\"     ABGs: No results found for: \"PHART\", \"PO2ART\", \"NHS4BVN\", \"WXE4RWL\", \"BEART\", \"W0JHVIOD\"     Type & Screen (If Applicable):  No results found for: \"LABABO\", \"LABRH\"    Drug/Infectious Status (If Applicable):  Lab Results   Component Value Date/Time    HEPCAB NONREACTIVE 02/11/2024 02:29 AM       COVID-19 Screening (If Applicable): No results found for: \"COVID19\"        Anesthesia Evaluation  Patient summary reviewed and Nursing notes reviewed   no history of anesthetic complications:   Airway: Mallampati: III  TM distance: >3 FB   Neck ROM: full  Mouth opening: > = 3 FB   Dental:          Pulmonary:   (+)           asthma:                            Cardiovascular:          ECG reviewed  Rhythm: regular                   ROS comment: Narrative & Impression    Normal sinus rhythmLow voltage QRSNormal ECGWhen compared with ECG of 21-FEB-2017 23:57,              Neuro/Psych:   (+) psychiatric history:depression/anxiety             GI/Hepatic/Renal:   (+) morbid obesity
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
## 2024-04-03 NOTE — ANESTHESIA PROCEDURE NOTES
Epidural Block    Patient location during procedure: OB  Reason for block: labor epidural  Staffing  Performed: resident/CRNA   Resident/CRNA: Yuliya Miner APRN - HERMES  Performed by: Yuliya Miner APRN - CRNA  Authorized by: Seb Sargent MD    Epidural  Patient position: sitting  Prep: Betasept and site prepped and draped  Patient monitoring: continuous pulse ox and frequent blood pressure checks  Approach: midline  Location: L3-4  Injection technique: МАРИНА saline and МАРИНА air  Provider prep: mask and sterile gloves  Needle  Needle type: Tuohy   Needle gauge: 17 G  Needle length: 3.5 in  Needle insertion depth: 6.5 cm  Catheter type: side hole  Catheter size: 19 G  Catheter at skin depth: 14 cm  Test dose: negativeCatheter Secured: tegaderm and tape  Assessment  Hemodynamics: stable  Attempts: 1  Outcomes: uncomplicated and patient tolerated procedure well  Preanesthetic Checklist  Completed: patient identified, IV checked, site marked, risks and benefits discussed, surgical/procedural consents, equipment checked, pre-op evaluation, timeout performed, anesthesia consent given, oxygen available, monitors applied/VS acknowledged, fire risk safety assessment completed and verbalized and blood product R/B/A discussed and consented

## 2024-04-03 NOTE — FLOWSHEET NOTE
Pt educated on the use of self-administered  nitrous oxide for pain control and side effects. Pt educated on when and how to use the mask appropriately. Pt educated that she is the only person allowed to hold the mask to her face and that no one should prop the mask against her face. Pt also educated that she is the only person in the room allowed to use the mask.Pt informed that she cannot be out of bed without a trained support person with her. Pt completed a return demonstration of the use of nitrous oxide and all questions and concerns were addressed. RN verified the presence of a signed agreement form for the nitrous oxide. Pre-intervention VS, assessment, and FHT'st as charted. Nitrous oxide and oxygen at a 50-50 mix was initiated at 1424. RN remains at bedside for the first 15 mins post initiation. Pt has experienced no  as side effects at this time.

## 2024-04-03 NOTE — FLOWSHEET NOTE
Pt reports pain scale of 6/10. She states that she has use the nitrous with approximately 75% of her contractions. Pt reports that the nitrous is helping with pain control of her contractions.

## 2024-04-03 NOTE — FLOWSHEET NOTE
1512   Yuliya JONES, HERMES, at bedside.  Epidural procedure explained, risks discussed.  Pt verbalizes consent for epidural.   1513 patient positioned for epidural.1515 Time out completed.   1520 catheter placed. 1521 test dose given.  Epidural catheter taped and secured per anesthesia.   1526 to low fowlers with left uterine displacement.  loading dose given. pump initiated.  Pt tolerated procedure well.

## 2024-04-04 PROCEDURE — 6370000000 HC RX 637 (ALT 250 FOR IP): Performed by: STUDENT IN AN ORGANIZED HEALTH CARE EDUCATION/TRAINING PROGRAM

## 2024-04-04 PROCEDURE — 1220000000 HC SEMI PRIVATE OB R&B

## 2024-04-04 PROCEDURE — 2580000003 HC RX 258: Performed by: STUDENT IN AN ORGANIZED HEALTH CARE EDUCATION/TRAINING PROGRAM

## 2024-04-04 PROCEDURE — 6360000002 HC RX W HCPCS: Performed by: OBSTETRICS & GYNECOLOGY

## 2024-04-04 RX ORDER — IBUPROFEN 800 MG/1
800 TABLET ORAL EVERY 6 HOURS PRN
Status: DISCONTINUED | OUTPATIENT
Start: 2024-04-04 | End: 2024-04-05 | Stop reason: HOSPADM

## 2024-04-04 RX ORDER — MEDROXYPROGESTERONE ACETATE 150 MG/ML
150 INJECTION, SUSPENSION INTRAMUSCULAR ONCE
Status: COMPLETED | OUTPATIENT
Start: 2024-04-04 | End: 2024-04-04

## 2024-04-04 RX ORDER — SENNA AND DOCUSATE SODIUM 50; 8.6 MG/1; MG/1
2 TABLET, FILM COATED ORAL
Status: DISCONTINUED | OUTPATIENT
Start: 2024-04-04 | End: 2024-04-05 | Stop reason: HOSPADM

## 2024-04-04 RX ORDER — ONDANSETRON 2 MG/ML
4 INJECTION INTRAMUSCULAR; INTRAVENOUS EVERY 4 HOURS PRN
Status: DISCONTINUED | OUTPATIENT
Start: 2024-04-04 | End: 2024-04-05 | Stop reason: HOSPADM

## 2024-04-04 RX ORDER — SODIUM CHLORIDE 0.9 % (FLUSH) 0.9 %
5-40 SYRINGE (ML) INJECTION PRN
Status: DISCONTINUED | OUTPATIENT
Start: 2024-04-04 | End: 2024-04-05 | Stop reason: HOSPADM

## 2024-04-04 RX ORDER — SODIUM CHLORIDE 9 MG/ML
INJECTION, SOLUTION INTRAVENOUS PRN
Status: DISCONTINUED | OUTPATIENT
Start: 2024-04-04 | End: 2024-04-05 | Stop reason: HOSPADM

## 2024-04-04 RX ORDER — BISACODYL 10 MG
10 SUPPOSITORY, RECTAL RECTAL DAILY PRN
Status: DISCONTINUED | OUTPATIENT
Start: 2024-04-04 | End: 2024-04-05 | Stop reason: HOSPADM

## 2024-04-04 RX ORDER — ACETAMINOPHEN 500 MG
1000 TABLET ORAL EVERY 6 HOURS PRN
Status: DISCONTINUED | OUTPATIENT
Start: 2024-04-04 | End: 2024-04-05 | Stop reason: HOSPADM

## 2024-04-04 RX ORDER — SIMETHICONE 80 MG
80 TABLET,CHEWABLE ORAL EVERY 6 HOURS PRN
Status: DISCONTINUED | OUTPATIENT
Start: 2024-04-04 | End: 2024-04-05 | Stop reason: HOSPADM

## 2024-04-04 RX ORDER — LANOLIN 72 %
OINTMENT (GRAM) TOPICAL PRN
Status: DISCONTINUED | OUTPATIENT
Start: 2024-04-04 | End: 2024-04-05 | Stop reason: HOSPADM

## 2024-04-04 RX ORDER — SODIUM CHLORIDE 0.9 % (FLUSH) 0.9 %
5-40 SYRINGE (ML) INJECTION EVERY 12 HOURS SCHEDULED
Status: DISCONTINUED | OUTPATIENT
Start: 2024-04-04 | End: 2024-04-05 | Stop reason: HOSPADM

## 2024-04-04 RX ADMIN — SODIUM CHLORIDE, PRESERVATIVE FREE 10 ML: 5 INJECTION INTRAVENOUS at 00:21

## 2024-04-04 RX ADMIN — ACETAMINOPHEN 1000 MG: 500 TABLET ORAL at 12:35

## 2024-04-04 RX ADMIN — DOCUSATE SODIUM 50 MG AND SENNOSIDES 8.6 MG 2 TABLET: 8.6; 5 TABLET, FILM COATED ORAL at 00:49

## 2024-04-04 RX ADMIN — SODIUM CHLORIDE, PRESERVATIVE FREE 10 ML: 5 INJECTION INTRAVENOUS at 10:21

## 2024-04-04 RX ADMIN — ACETAMINOPHEN 1000 MG: 500 TABLET ORAL at 04:52

## 2024-04-04 RX ADMIN — IBUPROFEN 800 MG: 800 TABLET, FILM COATED ORAL at 16:26

## 2024-04-04 RX ADMIN — WITCH HAZEL 1 EACH: 500 SOLUTION RECTAL; TOPICAL at 04:54

## 2024-04-04 RX ADMIN — DOCUSATE SODIUM 50 MG AND SENNOSIDES 8.6 MG 2 TABLET: 8.6; 5 TABLET, FILM COATED ORAL at 20:39

## 2024-04-04 RX ADMIN — ACETAMINOPHEN 1000 MG: 500 TABLET ORAL at 20:39

## 2024-04-04 RX ADMIN — IBUPROFEN 800 MG: 800 TABLET, FILM COATED ORAL at 00:49

## 2024-04-04 RX ADMIN — IBUPROFEN 800 MG: 800 TABLET, FILM COATED ORAL at 10:12

## 2024-04-04 RX ADMIN — MEDROXYPROGESTERONE ACETATE 150 MG: 150 INJECTION, SUSPENSION INTRAMUSCULAR at 16:31

## 2024-04-04 RX ADMIN — SODIUM CHLORIDE, PRESERVATIVE FREE 10 ML: 5 INJECTION INTRAVENOUS at 20:40

## 2024-04-04 ASSESSMENT — PAIN SCALES - GENERAL
PAINLEVEL_OUTOF10: 4
PAINLEVEL_OUTOF10: 0
PAINLEVEL_OUTOF10: 4
PAINLEVEL_OUTOF10: 2
PAINLEVEL_OUTOF10: 0
PAINLEVEL_OUTOF10: 0
PAINLEVEL_OUTOF10: 1
PAINLEVEL_OUTOF10: 1
PAINLEVEL_OUTOF10: 5
PAINLEVEL_OUTOF10: 3

## 2024-04-04 ASSESSMENT — PAIN - FUNCTIONAL ASSESSMENT
PAIN_FUNCTIONAL_ASSESSMENT: ACTIVITIES ARE NOT PREVENTED

## 2024-04-04 ASSESSMENT — PAIN DESCRIPTION - LOCATION
LOCATION: ABDOMEN
LOCATION: PERINEUM

## 2024-04-04 ASSESSMENT — PAIN DESCRIPTION - ORIENTATION
ORIENTATION: MID
ORIENTATION: MID;LOWER
ORIENTATION: LOWER;MID

## 2024-04-04 ASSESSMENT — PAIN DESCRIPTION - DESCRIPTORS
DESCRIPTORS: ACHING;SORE
DESCRIPTORS: ACHING;CRAMPING
DESCRIPTORS: CRAMPING
DESCRIPTORS: ACHING;CRAMPING

## 2024-04-04 NOTE — CONSULTS
INPATIENT CONSULT    Maternal /para status:     Maternal breastfeeding history:   States she breast fed her daughter for 6 months, but her son refused.     Current pregnancy:    Gestational age: 38 6/7 weeks     C/section or vaginal delivery:       Birth weight: 3445  7# 9oz    Plan for feeding: breast and formula      Breast pump at home: signed medical necessity form given        Assessment of breastfeeding:  States she was able to independently latch baby and he fed for 10 minutes.  Encouraged to call for LC if she needs assistance with positioning or feeds.         Reviewed:   - Breastfeeding packet  - Expectations for normal  feeding   - Hand expression  - Deep latch/milk transfer  - Cues for feeding (early/late)     Encouraged:   - Frequent skin to skin with mom or dad  - Frequent attempts to feed  - Calling for assistance as needed     Mom reports baby will be going to his paternal aunt who also has her other two children

## 2024-04-04 NOTE — PROGRESS NOTES
Labor Progress Note    Romina Amezcua is a 23 y.o. female  at 38w6d  The patient was seen and examined. Her pain is well controlled with epidural. She reports fetal movement is present, complains of contractions, complains of loss of fluid, denies vaginal bleeding.       Vital Signs:  Vitals:    24 1830 24 1900 24 1931 24   BP: 114/70 108/62 112/76 111/69   Pulse: 96 82 (!) 103 (!) 102   Resp: 15 15 15 16   Temp: 98.4 °F (36.9 °C)      TempSrc: Oral      SpO2: 96% 95% 96% 94%   Weight:       Height:             FHT: 130, moderate variability, accelerations present, occasional late deceleration that resolves spontaneously and with positions changes  Contractions: irregular difficult to trace    Chaperone for Intimate Exam: Chaperone was present for entire exam, Chaperone Name: LIZZETTE Campoverde  Cervical Exam:   Pitocin: @ 0 mu/min    Membranes: Ruptured clear fluid  Scalp Electrode in place: absent  Intrauterine Pressure Catheter in Place: present    Interventions: SVE, IUPC placement    Assessment/Plan:  Romina Amezcua is a 23 y.o. female  at 38w6d admitted for spontaneous labor   - GBS pending, No indication for GBS prophylaxis   - VSS, afebrile   - cEFM/TOCO cat 1   - SVE:    - IUPC placed   - Epidural   - Plan to start pitocin for labor augmentation   - Continue to monitor closely      Attending updated and in agreement with plan    Ros Mercado DO  Ob/Gyn Resident  4/3/2024, 8:16 PM

## 2024-04-04 NOTE — L&D DELIVERY NOTE
Vito Amezcua [[id number]]      Labor Events     Labor: No   Steroids: Full Course  Cervical Ripening Date/Time:      Antibiotics Received during Labor: No  Rupture Identifier: Sac 1  Rupture Date/Time:  4/3/24 13:00:00   Fluid Color: Clear  Fluid Odor: None              Anesthesia    Method: Epidural, Nitrous Oxide       Labor Event Times      Labor onset date/time:        Dilation complete date/time:  4/3/24 22:15:00     Start pushing date/time:  4/3/2024 22:22:00   Decision date/time (emergent ):            Delivery Details      Delivery Date: 4/3/24 Delivery Time: 22:24:15   Delivery Type: Vaginal, Spontaneous              [place] Presentation    Presentation: Vertex       Shoulder Dystocia    Shoulder Dystocia Present?: No       Assisted Delivery Details    Forceps Attempted?: No  Vacuum Extractor Attempted?: No                           Cord    Vessels: 3 Vessels  Complications: None  Delayed Cord Clamping?: Yes  Cord Clamped Date/Time: 4/3/2024 22:26:00  Cord Blood Disposition: Lab  Gases Sent?: Yes              Placenta    Date/Time: 4/3/2024 22:28:00  Removal: Spontaneous  Appearance: Intact       Lacerations    Episiotomy: None  Perineal Lacerations: None  Other Lacerations: labial laceration  Labial Laceration: left Repaired?: Yes          Vaginal Counts    Initial Count Personnel: DR. ORLANDO  Initial Count Verified By: JAYLAN RANDOLPH  Intial Sponge Count: Correct Intial Needles Count: Correct Intial Instruments Count: Correct   Final Sponges Count: Correct Final Needles  Count: Correct Final Instruments Count: Correct   Final Count Personnel: DR. ORLANDO  Final Count Verified By: LALA RANDOLPH  Accurate Final Count?: Yes       Blood Loss  Mother: Romina Amezcua LALA #[id number]     Start of Mother's Information      Delivery Blood Loss  24 1024 - 24 2252      Quantitative Blood Loss (mL) Hospital Encounter 100 grams    Total  100 mL               End of Mother's       Information for the patient's :  Vito Amezcua [[id number]]        Apgar scores: 8 at 1 minute and 9 at 5 minutes.     Anesthesia:  epidural anesthesia    Application and Delivery:    She was GBS unknown and did not receive antibiotics.     The patient progressed well, received an epidural, became complete and felt the urge to push. After pushing with contractions the fetal head delivered Cephalic, right occiput anterior over an intact perineum, nuchal cord was not present. The anterior, then posterior shoulder delivered easily and atraumatically followed by the rest of the infant. Nose and mouth suctioned with bulb suction, infant was stimulated and dried. Cord was clamped and cut after one minute delayed cord clamping  and infant was placed on the maternal abdomen, and attended by RN for evaluation. The delivery of the placenta was spontaneous and appeared intact, whole, and that the umbilical cord had three vessels noted. Pitocin was started. The vagina was swept of all clots and debris. The perineum and vagina were evaluated and a left labial laceration was found and repaired in standard fashion with 3-0 vicryl. Mother and baby tolerated procedure well.     Dr. Power was present for entire delivery.    Delivery Summary:       Specimen: cord blood and cord gases  Quantitative blood loss:  100 ml immediately following delivery  Condition:  mother and infant stable  Counts: instrument and sponge counts correct  Blood Type and Rh: O POSITIVE    Rubella Immunity Status: immune    Holli Guallpa DO  Ob/Gyn Resident  4/3/2024, 10:52 PM

## 2024-04-04 NOTE — FLOWSHEET NOTE
0100-Patient transferred to  room 746 via w/c with personal belongings for continuation of care.  0105-Bedside shift report given to ROXANNA Tovar RN.

## 2024-04-04 NOTE — PLAN OF CARE
Problem: Pain  Goal: Verbalizes/displays adequate comfort level or baseline comfort level  2024 by Amalia Granados, RN  Outcome: Progressing  Flowsheets (Taken 2024 1000)  Verbalizes/displays adequate comfort level or baseline comfort level:   Encourage patient to monitor pain and request assistance   Assess pain using appropriate pain scale   Administer analgesics based on type and severity of pain and evaluate response   Implement non-pharmacological measures as appropriate and evaluate response   Consider cultural and social influences on pain and pain management   Notify Licensed Independent Practitioner if interventions unsuccessful or patient reports new pain  2024 0731 by Thelma Yang RN  Outcome: Progressing  Flowsheets (Taken 2024 0115)  Verbalizes/displays adequate comfort level or baseline comfort level:   Encourage patient to monitor pain and request assistance   Assess pain using appropriate pain scale   Administer analgesics based on type and severity of pain and evaluate response   Implement non-pharmacological measures as appropriate and evaluate response     Problem: Postpartum  Goal: Experiences normal postpartum course  Description:  Postpartum OB-Pregnancy care plan goal which identifies if the mother is experiencing a normal postpartum course  2024 by Amalia Granados, RN  Outcome: Progressing  2024 by Thelma Yang RN  Outcome: Progressing  Goal: Appropriate maternal -  bonding  Description:  Postpartum OB-Pregnancy care plan goal which identifies if the mother and  are bonding appropriately  2024 by Amalia Granados, RN  Outcome: Progressing  2024 by Thelma Yang RN  Outcome: Progressing  Goal: Establishment of infant feeding pattern  Description:  Postpartum OB-Pregnancy care plan goal which identifies if the mother is establishing a feeding pattern with their   2024 by Amalia Granados,
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
## 2024-04-04 NOTE — FLOWSHEET NOTE
Postpartum and  care booklet at bedside.  Encouraged to review and ask questions if needed before discharge.

## 2024-04-04 NOTE — CARE COORDINATION
CASE MANAGEMENT POST-PARTUM TRANSITIONAL CARE PLAN    38 weeks gestation of pregnancy [Z3A.38]    OB Provider: Linda Andrew at [facility]    Writer met w/ Romina at her bedside to discuss DCP. She is S/P  on 4/3/2024    Writer verified address/phone number correct on facesheet. She states she lives with FOB David Asencio and one of his friends. Romina denied barriers with transportation home, to doctors appointments or with paying for medications upon discharge home.     [place]. Writer notified Romina she has 30 days from date of birth to add  to insurance policy. She verbalized understanding.    Romina confirmed a safe place for infant to sleep at home.  She states that the baby is going to go home with David's sister, who also has their other two children.  She states that his sister has a safe place to sleep.     Infant name on BC: Tyler Asencio.   Infant PCP mom unsure who dad's sister will take baby to. .     DME: no  HOME CARE: no    Anticipated DC of mother and infant 1-2 days after .     Readmission Risk              Risk of Unplanned Readmission:  9

## 2024-04-04 NOTE — CARE COORDINATION
Social Work     Sw reviewed medical record (current active problem list) and tox screens and found Mom is +THC at delivery, has hx of mental health issues (bi polar- hx or suicide attempt via OD on SSRI, and mom is on CS watchlist, hx of living on the streets).     Sw spoke with mom briefly to assess and provide support.  Mom's mother Ana present, asleep on couch.  Mom provided consent for assessment to occur.    Mom states she knows she will not be raising child.  Mom states her other 2 children (2, 1) in [facility] custody, placed with Paternal Aunt Courtney, mom states this baby will also be placed with Courtney.    Mom states she has hx of homelessness and living on the streets.  Mom reports she has current housing and resides with fob (David Cr- father of all 3 children) and family friends at [address] [place] [address], [facility].    Mom states her current [facility] CW is Monica Gaona.      Mom aware Sw will begin coordination with [facility].  Referral made to intake (Charito).    No DC for baby until [facility] relays plan.    Sw encouraged mom to reach out if any issues or concerns arise.

## 2024-04-04 NOTE — PLAN OF CARE
Problem: Pain  Goal: Verbalizes/displays adequate comfort level or baseline comfort level  Outcome: Progressing  Flowsheets (Taken 2024)  Verbalizes/displays adequate comfort level or baseline comfort level:   Encourage patient to monitor pain and request assistance   Assess pain using appropriate pain scale   Administer analgesics based on type and severity of pain and evaluate response   Implement non-pharmacological measures as appropriate and evaluate response     Problem: Postpartum  Goal: Experiences normal postpartum course  Description:  Postpartum OB-Pregnancy care plan goal which identifies if the mother is experiencing a normal postpartum course  Outcome: Progressing  Goal: Appropriate maternal -  bonding  Description:  Postpartum OB-Pregnancy care plan goal which identifies if the mother and  are bonding appropriately  Outcome: Progressing  Goal: Establishment of infant feeding pattern  Description:  Postpartum OB-Pregnancy care plan goal which identifies if the mother is establishing a feeding pattern with their   Outcome: Progressing  Goal: Incisions, wounds, or drain sites healing without S/S of infection  Outcome: Progressing     Problem: Safety - Adult  Goal: Free from fall injury  Outcome: Progressing     Problem: Discharge Planning  Goal: Discharge to home or other facility with appropriate resources  Outcome: Progressing

## 2024-04-05 VITALS
RESPIRATION RATE: 16 BRPM | TEMPERATURE: 98.1 F | HEIGHT: 64 IN | WEIGHT: 230 LBS | SYSTOLIC BLOOD PRESSURE: 126 MMHG | BODY MASS INDEX: 39.27 KG/M2 | OXYGEN SATURATION: 98 % | DIASTOLIC BLOOD PRESSURE: 80 MMHG | HEART RATE: 67 BPM

## 2024-04-05 PROCEDURE — 6360000002 HC RX W HCPCS: Performed by: STUDENT IN AN ORGANIZED HEALTH CARE EDUCATION/TRAINING PROGRAM

## 2024-04-05 PROCEDURE — 6370000000 HC RX 637 (ALT 250 FOR IP)

## 2024-04-05 PROCEDURE — 90471 IMMUNIZATION ADMIN: CPT | Performed by: STUDENT IN AN ORGANIZED HEALTH CARE EDUCATION/TRAINING PROGRAM

## 2024-04-05 PROCEDURE — 90715 TDAP VACCINE 7 YRS/> IM: CPT | Performed by: STUDENT IN AN ORGANIZED HEALTH CARE EDUCATION/TRAINING PROGRAM

## 2024-04-05 PROCEDURE — 6370000000 HC RX 637 (ALT 250 FOR IP): Performed by: STUDENT IN AN ORGANIZED HEALTH CARE EDUCATION/TRAINING PROGRAM

## 2024-04-05 RX ORDER — BENZOCAINE/MENTHOL 6 MG-10 MG
LOZENGE MUCOUS MEMBRANE 2 TIMES DAILY
Status: DISCONTINUED | OUTPATIENT
Start: 2024-04-05 | End: 2024-04-05 | Stop reason: HOSPADM

## 2024-04-05 RX ADMIN — ACETAMINOPHEN 1000 MG: 500 TABLET ORAL at 04:25

## 2024-04-05 RX ADMIN — HYDROCORTISONE: 10 CREAM TOPICAL at 10:11

## 2024-04-05 RX ADMIN — IBUPROFEN 800 MG: 800 TABLET, FILM COATED ORAL at 01:32

## 2024-04-05 RX ADMIN — IBUPROFEN 800 MG: 800 TABLET, FILM COATED ORAL at 08:03

## 2024-04-05 RX ADMIN — TETANUS TOXOID, REDUCED DIPHTHERIA TOXOID AND ACELLULAR PERTUSSIS VACCINE, ADSORBED 0.5 ML: 5; 2.5; 8; 8; 2.5 SUSPENSION INTRAMUSCULAR at 12:30

## 2024-04-05 ASSESSMENT — PAIN - FUNCTIONAL ASSESSMENT
PAIN_FUNCTIONAL_ASSESSMENT: ACTIVITIES ARE NOT PREVENTED

## 2024-04-05 ASSESSMENT — PAIN DESCRIPTION - ORIENTATION
ORIENTATION: LOWER;MID
ORIENTATION: MID
ORIENTATION: MID

## 2024-04-05 ASSESSMENT — PAIN DESCRIPTION - DESCRIPTORS
DESCRIPTORS: CRAMPING
DESCRIPTORS: CRAMPING;DISCOMFORT
DESCRIPTORS: CRAMPING;DISCOMFORT

## 2024-04-05 ASSESSMENT — PAIN SCALES - GENERAL
PAINLEVEL_OUTOF10: 4
PAINLEVEL_OUTOF10: 2
PAINLEVEL_OUTOF10: 2
PAINLEVEL_OUTOF10: 1
PAINLEVEL_OUTOF10: 3

## 2024-04-05 ASSESSMENT — PAIN DESCRIPTION - LOCATION
LOCATION: ABDOMEN

## 2024-04-05 ASSESSMENT — PAIN DESCRIPTION - FREQUENCY: FREQUENCY: INTERMITTENT

## 2024-04-05 ASSESSMENT — PAIN DESCRIPTION - PAIN TYPE: TYPE: ACUTE PAIN

## 2024-04-05 NOTE — PLAN OF CARE
Problem: Pain  Goal: Verbalizes/displays adequate comfort level or baseline comfort level  2024 1307 by Amalia Granados RN  Outcome: Completed  Flowsheets (Taken 2024)  Verbalizes/displays adequate comfort level or baseline comfort level:   Encourage patient to monitor pain and request assistance   Assess pain using appropriate pain scale   Administer analgesics based on type and severity of pain and evaluate response   Implement non-pharmacological measures as appropriate and evaluate response   Consider cultural and social influences on pain and pain management   Notify Licensed Independent Practitioner if interventions unsuccessful or patient reports new pain  2024 07 by Thelma Yang RN  Outcome: Progressing  Flowsheets (Taken 2024)  Verbalizes/displays adequate comfort level or baseline comfort level:   Encourage patient to monitor pain and request assistance   Assess pain using appropriate pain scale   Administer analgesics based on type and severity of pain and evaluate response   Implement non-pharmacological measures as appropriate and evaluate response     Problem: Postpartum  Goal: Experiences normal postpartum course  Description:  Postpartum OB-Pregnancy care plan goal which identifies if the mother is experiencing a normal postpartum course  2024 1307 by Amalia Granados, RN  Outcome: Completed  2024 by Thelma Yang RN  Outcome: Progressing  Goal: Appropriate maternal -  bonding  Description:  Postpartum OB-Pregnancy care plan goal which identifies if the mother and  are bonding appropriately  2024 1307 by Amalia Granados, RN  Outcome: Completed  2024 by Thelma Yang RN  Outcome: Progressing  Goal: Establishment of infant feeding pattern  Description:  Postpartum OB-Pregnancy care plan goal which identifies if the mother is establishing a feeding pattern with their   2024 1307 by Amalia Granados,

## 2024-04-05 NOTE — FLOWSHEET NOTE
Initiation of Electric Breast Pumping      Pumping Initiated 1130     Initiated due to    [x]   Baby in NICU    Instructions   [x]   Verbal instructions on how to setup hand pump   [x]   Expectation sheet for Breastfeeding mothers with pumping log   [x]   Frequency of pumping   [x]   Collection,labeling and storage of colostrum and milk     Supplies Provided   [x]   Hand pump initiation kit   [x]   Cleaning supplies (basin and soap)   [x]   Oral syringes/snappies   [x]   Patient label               Pumped milk labeled and mother planning to take to NICU.  Obtained 2 oz with hand pump.  Mother denies any questions.  Lactation consultant vs and mother given contact information for follow up if needed.

## 2024-04-05 NOTE — PROGRESS NOTES
Resident Interval Note    Patient was seen and evaluated at bedside. She reports developing a rash following using the hospital provided soap for bathe this morning. There is a mild associated itching which has been largely aproblematic.     On physical exam, a mild red, blanching rash is noted over the left arm and upper chest which appears consistent with contact dermatitis. Patient started on low dose hydrocortisone cream for control of symptoms until discharge.     Mundo Barajas MD  Obstetrics & Gynecology Resident, PGY-2  [place], [place]  4/5/2024 8:47 AM

## 2024-04-05 NOTE — LACTATION NOTE
Pt states baby went to NICU last night and she has not pumped or fed at breast since then. She is being discharged today. Pt given [employer] hand pump and instructed on use. Pt plans to  a breast pump at [facility] when discharged today. Reviewed removing milk consistently every 2-3 hours. PT verbalized understanding.

## 2024-04-05 NOTE — PLAN OF CARE
Problem: Pain  Goal: Verbalizes/displays adequate comfort level or baseline comfort level  4/5/2024 0701 by Thelma Yang, RN  Outcome: Progressing  Flowsheets (Taken 4/4/2024 2039)  Verbalizes/displays adequate comfort level or baseline comfort level:   Encourage patient to monitor pain and request assistance   Assess pain using appropriate pain scale   Administer analgesics based on type and severity of pain and evaluate response   Implement non-pharmacological measures as appropriate and evaluate response  4/4/2024 1829 by Amalia Granados RN  Outcome: Progressing  Flowsheets (Taken 4/4/2024 1000)  Verbalizes/displays adequate comfort level or baseline comfort level:   Encourage patient to monitor pain and request assistance   Assess pain using appropriate pain scale   Administer analgesics based on type and severity of pain and evaluate response   Implement non-pharmacological measures as appropriate and evaluate response   Consider cultural and social influences on pain and pain management   Notify Licensed Independent Practitioner if interventions unsuccessful or patient reports new pain

## 2024-04-05 NOTE — PROGRESS NOTES
CLINICAL PHARMACY NOTE: MEDS TO BEDS    Total # of Prescriptions Filled: 3   The following medications were delivered to the patient:  Senokot 8.6-50mg  Acetaminophen 500mg  Ibuprofen 600mg    Additional Documentation: delivered to patient in room 746 4/5 at 10:14am. No co-pay.

## 2024-04-05 NOTE — FLOWSHEET NOTE
Discharge teaching and instructions completed. AVS reviewed.  [facility] your life: Post-Birth Warning Signs handout reviewed and given to mother. Understanding verbalized.  Printed prescriptions filled by [facility] outpatient pharmacy.  Patient voiced understanding regarding prescriptions, follow up appointments, and care of self at home.

## 2024-04-05 NOTE — CARE COORDINATION
Social Work     Per NICU rounds, baby is being treated for pneumonia, will need 7 days of medication before dc.       Sw updated [facility] CW Monica Jimenez [phone] via text.       Staffing was today for baby, sw has not been updated on exact dc plan.       Sw will follow up with [facility] next week.       Mom very open and aware baby will not dc with her, this has been plan she has known via [facility] during her pregnancy.
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
## 2024-04-08 NOTE — ANESTHESIA POSTPROCEDURE EVALUATION
POST- ANESTHESIA EVALUATION       Pt Name: Romina Amezcua  MRN: [id number]  YOB: 2000  Date of evaluation: 4/8/2024  Time:  8:38 AM      There were no vitals taken for this visit.     Consciousness Level  Awake  Cardiopulmonary Status  Stable  Pain Adequately Treated YES  Nausea / Vomiting  NO  Adequate Hydration  YES  Anesthesia Related Complications NONE      Electronically signed by Seb Sargent MD on 4/8/2024 at 8:38 AM     Department of Anesthesiology  Postprocedure Note    Patient: Romina Amezcua  MRN: [id number]  YOB: 2000  Date of evaluation: 4/8/2024    Procedure Summary       Date: 04/03/24 Room / Location:     Anesthesia Start: 1509 Anesthesia Stop: 2224    Procedure: Labor Analgesia Diagnosis:     Scheduled Providers:  Responsible Provider: Seb Sargent MD    Anesthesia Type: epidural ASA Status: 2            Anesthesia Type: No value filed.    Cristel Phase I:      Cristel Phase II:      Anesthesia Post Evaluation    No notable events documented.